# Patient Record
Sex: FEMALE | Race: OTHER | URBAN - METROPOLITAN AREA
[De-identification: names, ages, dates, MRNs, and addresses within clinical notes are randomized per-mention and may not be internally consistent; named-entity substitution may affect disease eponyms.]

---

## 2017-07-31 ENCOUNTER — OUTPATIENT (OUTPATIENT)
Dept: OUTPATIENT SERVICES | Facility: HOSPITAL | Age: 47
LOS: 1 days | Discharge: HOME | End: 2017-07-31

## 2017-07-31 DIAGNOSIS — R10.11 RIGHT UPPER QUADRANT PAIN: ICD-10-CM

## 2017-07-31 DIAGNOSIS — I11.9 HYPERTENSIVE HEART DISEASE WITHOUT HEART FAILURE: ICD-10-CM

## 2017-07-31 DIAGNOSIS — E78.2 MIXED HYPERLIPIDEMIA: ICD-10-CM

## 2018-03-19 ENCOUNTER — OUTPATIENT (OUTPATIENT)
Dept: OUTPATIENT SERVICES | Facility: HOSPITAL | Age: 48
LOS: 1 days | Discharge: HOME | End: 2018-03-19

## 2018-03-20 DIAGNOSIS — Z00.00 ENCOUNTER FOR GENERAL ADULT MEDICAL EXAMINATION WITHOUT ABNORMAL FINDINGS: ICD-10-CM

## 2019-07-25 ENCOUNTER — OUTPATIENT (OUTPATIENT)
Dept: OUTPATIENT SERVICES | Facility: HOSPITAL | Age: 49
LOS: 1 days | Discharge: HOME | End: 2019-07-25

## 2019-07-26 DIAGNOSIS — M25.50 PAIN IN UNSPECIFIED JOINT: ICD-10-CM

## 2019-07-26 DIAGNOSIS — R10.11 RIGHT UPPER QUADRANT PAIN: ICD-10-CM

## 2019-07-26 DIAGNOSIS — E78.2 MIXED HYPERLIPIDEMIA: ICD-10-CM

## 2022-05-04 ENCOUNTER — APPOINTMENT (OUTPATIENT)
Dept: ORTHOPEDIC SURGERY | Facility: CLINIC | Age: 52
End: 2022-05-04

## 2022-05-04 PROBLEM — Z00.00 ENCOUNTER FOR PREVENTIVE HEALTH EXAMINATION: Status: ACTIVE | Noted: 2022-05-04

## 2022-05-31 ENCOUNTER — APPOINTMENT (OUTPATIENT)
Dept: ORTHOPEDIC SURGERY | Facility: CLINIC | Age: 52
End: 2022-05-31
Payer: OTHER MISCELLANEOUS

## 2022-05-31 PROCEDURE — 99214 OFFICE O/P EST MOD 30 MIN: CPT

## 2022-05-31 PROCEDURE — 99072 ADDL SUPL MATRL&STAF TM PHE: CPT

## 2022-05-31 RX ORDER — CELECOXIB 200 MG/1
200 CAPSULE ORAL TWICE DAILY
Qty: 60 | Refills: 2 | Status: ACTIVE | COMMUNITY
Start: 2022-05-31 | End: 1900-01-01
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
## 2022-05-31 NOTE — ASSESSMENT
[FreeTextEntry1] : we reviewed the anatomy, pathology, and treatment options for TFCC tears. We discussed that most of the TFCC is avascular and tears are slow to heal if at all but that surgical results are not guaranteed due to the poor healing capacity of the structure.  Even at surgery, most tears cannot be repaired, but are merely debrided.  We discussed the use of nsaids, bracing, rest, local modalities, injection and surgery in the treatment of TFCC tears. At this point, the patient will proceed with non-operative treatment.\par

## 2022-05-31 NOTE — PHYSICAL EXAM
[de-identified] : Left Hand: Inspection of the hand/wrist is as follows: No swelling. Palpation of the hand/wrist is as\par follows: Tenderness over anatomic snuff box. ttp dorsal SL Range of motion of the hand/wrist is as\par follows: Wrist dorsiflexion to 50 degrees. Wrist volarflexion to 50 degrees. good active flexion and extension of all\par finger joints. Special testing of the hand/wrist is as follows: painful watsons Neurological testing of the hand/wrist is\par as follows: light touch intact throughout, palpable radial pulse and good capillary refill in all fingers.

## 2022-07-12 ENCOUNTER — APPOINTMENT (OUTPATIENT)
Dept: ORTHOPEDIC SURGERY | Facility: CLINIC | Age: 52
End: 2022-07-12

## 2022-07-12 VITALS — BODY MASS INDEX: 32.13 KG/M2 | WEIGHT: 212 LBS | HEIGHT: 68 IN

## 2022-07-12 PROCEDURE — 99072 ADDL SUPL MATRL&STAF TM PHE: CPT

## 2022-07-12 PROCEDURE — 99214 OFFICE O/P EST MOD 30 MIN: CPT

## 2022-07-12 RX ORDER — CELECOXIB 200 MG/1
200 CAPSULE ORAL TWICE DAILY
Qty: 60 | Refills: 2 | Status: ACTIVE | COMMUNITY
Start: 2022-07-12 | End: 1900-01-01

## 2022-07-12 NOTE — ASSESSMENT
[FreeTextEntry1] : we reviewed the anatomy, pathology, and treatment options for TFCC tears. We discussed that most of the TFCC is avascular and tears are slow to heal if at all but that surgical results are not guaranteed due to the poor healing capacity of the structure.  Even at surgery, most tears cannot be repaired, but are merely debrided.  We discussed the use of nsaids, bracing, rest, local modalities, injection and surgery in the treatment of TFCC tears. At this point, the patient will proceed with non-operative treatment.\par \par Pt referred for OT x 8 weeks.\par Continue use of Widget Brace.\par Possibility of CSI to the left TFCC region discussed. \par Refill of Celebrex provided.\par RTO in 3 mos for f/u care. \par
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
## 2022-07-12 NOTE — IMAGING
[de-identified] : Left Hand: Inspection of the hand/wrist is as follows: No swelling. Palpation of the hand/wrist is as\par follows: +ttp over the left TFCC region with + Grind test. No tenderness over anatomic snuff box. Minimal ttp dorsal SL \par Range of motion of the hand/wrist is as follows: Wrist dorsiflexion to 60 degrees. Wrist volarflexion to 30 degrees. good active flexion and extension of all\par finger joints. Special testing of the hand/wrist is as follows: painful watsons Neurological testing of the hand/wrist is\par as follows: light touch intact throughout, palpable radial pulse and good capillary refill in all fingers.\par Spurling Signs are negative symmetrically.\par \par Incidental finding of left shoulder pain is noted.\par Pt has + impingement signs and mildly limited strength with abduction and IROM.\par (Pt will f/u with this on her insurance). \par

## 2022-10-11 ENCOUNTER — APPOINTMENT (OUTPATIENT)
Dept: ORTHOPEDIC SURGERY | Facility: CLINIC | Age: 52
End: 2022-10-11

## 2022-11-16 ENCOUNTER — NON-APPOINTMENT (OUTPATIENT)
Age: 52
End: 2022-11-16

## 2022-11-16 ENCOUNTER — APPOINTMENT (OUTPATIENT)
Dept: ORTHOPEDIC SURGERY | Facility: CLINIC | Age: 52
End: 2022-11-16

## 2022-11-16 VITALS — HEIGHT: 68 IN | WEIGHT: 212 LBS | BODY MASS INDEX: 32.13 KG/M2

## 2022-11-16 PROCEDURE — L3908: CPT | Mod: LT

## 2022-11-16 PROCEDURE — 99214 OFFICE O/P EST MOD 30 MIN: CPT

## 2022-11-16 PROCEDURE — 99072 ADDL SUPL MATRL&STAF TM PHE: CPT

## 2022-11-16 RX ORDER — CELECOXIB 200 MG/1
200 CAPSULE ORAL TWICE DAILY
Qty: 60 | Refills: 2 | Status: ACTIVE | COMMUNITY
Start: 2022-11-16 | End: 1900-01-01

## 2022-11-16 NOTE — IMAGING
[de-identified] : Left Hand: Inspection of the hand/wrist is as follows: No swelling. Palpation of the hand/wrist is as\par follows: +ttp over the left TFCC region with + Grind test. \par No tenderness over anatomic snuff box. Minimal ttp dorsal SL \par Range of motion of the hand/wrist is as follows: \par Wrist dorsiflexion to 80 degrees. \par Wrist volar flexion to 90 degrees. \par good active flexion and extension of allfinger joints. ]\par Special testing of the hand/wrist is as follows: painful watsons \par Neurological testing of the hand/wrist is\par as follows: light touch intact throughout, palpable radial pulse and good capillary refill in all fingers.\par +Tinel sign over the left carpal tunnel / negative over the Cubital Tunnel. \par Spurling Signs are negative symmetrically.\par \par Incidental finding of left shoulder pain is noted.\par Pt has + impingement signs and mildly limited strength with abduction and IROM.\par (Pt again instructed to f/u with this on her private insurance). \par
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
## 2022-11-16 NOTE — WORK
[Sprain/Strain] : sprain/strain [Was the competent medical cause of the injury] : was the competent medical cause of the injury [Are consistent with the injury] : are consistent with the injury [Consistent with my objective findings] : consistent with my objective findings [Total] : total [Does not reveal pre-existing condition(s) that may affect treatment/prognosis] : does not reveal pre-existing condition(s) that may affect treatment/prognosis [Can return to work without limitations on ______] : can return to work without limitations on [unfilled] [Lifting] : lifting [Pulling/Pushing] : pulling/pushing [Use of upper extremities] : use of upper extremities [Unknown at this time] : : unknown at this time [Patient] : patient [No Rx restrictions] : No Rx restrictions. [I provided the services listed above] :  I provided the services listed above. [FreeTextEntry1] : guarded

## 2022-11-16 NOTE — ASSESSMENT
[FreeTextEntry1] : we reviewed the anatomy, pathology, and treatment options for TFCC tears. We discussed that most of the TFCC is avascular and tears are slow to heal if at all but that surgical results are not guaranteed due to the poor healing capacity of the structure.  Even at surgery, most tears cannot be repaired, but are merely debrided.  We discussed the use of nsaids, bracing, rest, local modalities, injection and surgery in the treatment of TFCC tears. At this point, the patient will proceed with non-operative treatment.\par \par Pt referred for OT x 8 weeks.\par Continue use of Widget Brace prn pain\par Possibility of CSI to the left TFCC region discussed. \par Celebrex qd/bid prn pain provided with 2 refills.\par Pt will stay out of work x 2 weeks.\par RTO in 2 mos for f/u care. \par Night splint x 6 weeks.

## 2022-12-09 ENCOUNTER — NON-APPOINTMENT (OUTPATIENT)
Age: 52
End: 2022-12-09

## 2022-12-12 ENCOUNTER — APPOINTMENT (OUTPATIENT)
Dept: ORTHOPEDIC SURGERY | Facility: CLINIC | Age: 52
End: 2022-12-12

## 2022-12-16 ENCOUNTER — NON-APPOINTMENT (OUTPATIENT)
Age: 52
End: 2022-12-16

## 2022-12-28 ENCOUNTER — APPOINTMENT (OUTPATIENT)
Dept: ORTHOPEDIC SURGERY | Facility: CLINIC | Age: 52
End: 2022-12-28

## 2022-12-28 PROCEDURE — 99072 ADDL SUPL MATRL&STAF TM PHE: CPT

## 2022-12-28 PROCEDURE — 99214 OFFICE O/P EST MOD 30 MIN: CPT

## 2022-12-28 NOTE — ASSESSMENT
[FreeTextEntry1] : we reviewed the anatomy, pathology, and treatment options for TFCC tears. We discussed that most of the TFCC is avascular and tears are slow to heal if at all but that surgical results are not guaranteed due to the poor healing capacity of the structure.  Even at surgery, most tears cannot be repaired, but are merely debrided.  We discussed the use of nsaids, bracing, rest, local modalities, injection and surgery in the treatment of TFCC tears. At this point, the patient will proceed with non-operative treatment.\par will continue with  OT.\par Continue use of Widget Brace prn pain\par patient feels she is unable to work \par RTO in 2 mos for f/u care. \par .

## 2022-12-28 NOTE — IMAGING
[de-identified] : Left Hand: Inspection of the hand/wrist is as follows: No swelling. Palpation of the hand/wrist is as\par follows: +ttp over the left TFCC region with + Grind test. \par No tenderness over anatomic snuff box. Minimal ttp dorsal SL \par Range of motion of the hand/wrist is as follows: \par Wrist dorsiflexion to 80 degrees. \par Wrist volar flexion to 90 degrees. \par good active flexion and extension of allfinger joints. ]\par Special testing of the hand/wrist is as follows: painful watsons \par Neurological testing of the hand/wrist is\par as follows: light touch intact throughout, palpable radial pulse and good capillary refill in all fingers.\par +Tinel sign over the left carpal tunnel / negative over the Cubital Tunnel. \par Spurling Signs are negative symmetrically.\par \par \par

## 2023-01-10 ENCOUNTER — NON-APPOINTMENT (OUTPATIENT)
Age: 53
End: 2023-01-10

## 2023-01-26 ENCOUNTER — NON-APPOINTMENT (OUTPATIENT)
Age: 53
End: 2023-01-26

## 2023-02-06 ENCOUNTER — FORM ENCOUNTER (OUTPATIENT)
Age: 53
End: 2023-02-06

## 2023-02-28 ENCOUNTER — APPOINTMENT (OUTPATIENT)
Dept: ORTHOPEDIC SURGERY | Facility: CLINIC | Age: 53
End: 2023-02-28
Payer: OTHER MISCELLANEOUS

## 2023-02-28 VITALS — WEIGHT: 212 LBS | BODY MASS INDEX: 32.13 KG/M2 | HEIGHT: 68 IN

## 2023-02-28 PROCEDURE — 99214 OFFICE O/P EST MOD 30 MIN: CPT

## 2023-02-28 PROCEDURE — 99072 ADDL SUPL MATRL&STAF TM PHE: CPT

## 2023-02-28 NOTE — REASON FOR VISIT
[FreeTextEntry2] :  2/16/22 Follow up visit for left wrist.  Pt has pain in wrist shooting up to elbow.

## 2023-02-28 NOTE — ASSESSMENT
[FreeTextEntry1] : we reviewed the anatomy, pathology, and treatment options for TFCC tears. We discussed that most of the TFCC is avascular and tears are slow to heal if at all but that surgical results are not guaranteed due to the poor healing capacity of the structure.  Even at surgery, most tears cannot be repaired, but are merely debrided.  We discussed the use of nsaids, bracing, rest, local modalities, injection and surgery in the treatment of TFCC tears. At this point, the patient will proceed with non-operative treatment.\par will continue with  OT.\par \par Pt will attempt to have left shoulder and elbow added onto her case. \par Continue use of Widget Brace prn pain\par patient currently working full duty. \par RTO in 3 mos for f/u care. \par .
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
## 2023-02-28 NOTE — IMAGING
[de-identified] : Left Hand: Inspection of the hand/wrist is as follows: \par No swelling. \par Palpation of the hand/wrist is as\par follows: +ttp over the left TFCC region with + Grind test. \par No tenderness over anatomic snuff box. Minimal ttp dorsal SL \par Range of motion of the hand/wrist is as follows: \par Wrist dorsiflexion to 90 degrees. \par Wrist volar flexion to 90 degrees. \par good active flexion and extension of all finger joints. \par Special testing of the hand/wrist is as follows: painful Watsons w/o instability\par Neurological testing of the hand/wrist is\par as follows: light touch intact throughout, palpable radial pulse and good capillary refill in all fingers.\par Mildly +Tinel sign over the left carpal tunnel / negative over the Cubital Tunnel. \par \par Spurling Signs are negative symmetrically\par Cervical ROM is full with mild pain on lateral rotation.\par Ayoub Signs are negative. \par Left shoulder with full ROM.\par Strength is 5/5 in all planes.\par Pt has + impingement signs and ttp over the anterolateral subacromial region.\par There is no ligamentous laxity noted\par Left elbow with full ROM.\par Mild ttp over the distal triceps tendon.\par There is no ligamentous laxity to the left elbow.\par \par \par

## 2023-03-15 ENCOUNTER — FORM ENCOUNTER (OUTPATIENT)
Age: 53
End: 2023-03-15
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
## 2023-06-06 ENCOUNTER — APPOINTMENT (OUTPATIENT)
Dept: ORTHOPEDIC SURGERY | Facility: CLINIC | Age: 53
End: 2023-06-06
Payer: OTHER MISCELLANEOUS

## 2023-06-06 VITALS — BODY MASS INDEX: 32.13 KG/M2 | WEIGHT: 212 LBS | HEIGHT: 68 IN

## 2023-06-06 PROCEDURE — 99214 OFFICE O/P EST MOD 30 MIN: CPT
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
## 2023-06-06 NOTE — ASSESSMENT
[FreeTextEntry1] : we reviewed the anatomy, pathology, and treatment options for TFCC tears. We discussed that most of the TFCC is avascular and tears are slow to heal if at all but that surgical results are not guaranteed due to the poor healing capacity of the structure.  Even at surgery, most tears cannot be repaired, but are merely debrided.  We discussed the use of nsaids, bracing, rest, local modalities, injection and surgery in the treatment of TFCC tears. At this point, the patient will proceed with non-operative treatment.\par \par C/w OT\par Continue use of Widget Brace prn pain\par patient currently working full duty. \par RTO in 3 mos for f/u care. \par .

## 2023-06-06 NOTE — WORK
[Sprain/Strain] : sprain/strain [Was the competent medical cause of the injury] : was the competent medical cause of the injury [Are consistent with the injury] : are consistent with the injury [Consistent with my objective findings] : consistent with my objective findings [Does not reveal pre-existing condition(s) that may affect treatment/prognosis] : does not reveal pre-existing condition(s) that may affect treatment/prognosis [Can return to work without limitations on ______] : can return to work without limitations on [unfilled] [Lifting] : lifting [Pulling/Pushing] : pulling/pushing [Use of upper extremities] : use of upper extremities [Unknown at this time] : : unknown at this time [Patient] : patient [No Rx restrictions] : No Rx restrictions. [I provided the services listed above] :  I provided the services listed above. [FreeTextEntry1] : guarded

## 2023-08-10 ENCOUNTER — APPOINTMENT (OUTPATIENT)
Dept: ORTHOPEDIC SURGERY | Facility: CLINIC | Age: 53
End: 2023-08-10
Payer: OTHER MISCELLANEOUS

## 2023-08-10 VITALS — HEIGHT: 68 IN | BODY MASS INDEX: 32.13 KG/M2 | WEIGHT: 212 LBS

## 2023-08-10 DIAGNOSIS — S43.402A UNSPECIFIED SPRAIN OF LEFT SHOULDER JOINT, INITIAL ENCOUNTER: ICD-10-CM

## 2023-08-10 PROCEDURE — 99214 OFFICE O/P EST MOD 30 MIN: CPT

## 2023-08-10 PROCEDURE — 95864 NEEDLE EMG 4 EXTREMITIES: CPT | Mod: LT

## 2023-08-10 NOTE — REASON FOR VISIT
[FreeTextEntry2] : Follow up for left wrist. Pain is the same. Has not been to physical therapy since last visit. Has been taking tylenol and wearing brace.

## 2023-08-10 NOTE — ASSESSMENT
[FreeTextEntry1] : we reviewed the anatomy, pathology, and treatment options for TFCC tears. We discussed that most of the TFCC is avascular and tears are slow to heal if at all but that surgical results are not guaranteed due to the poor healing capacity of the structure.  Even at surgery, most tears cannot be repaired, but are merely debrided.  We discussed the use of nsaids, bracing, rest, local modalities, injection and surgery in the treatment of TFCC tears. At this point, the patient will proceed with non-operative treatment.  C/w PT Continue use of Widget Brace prn pain patient currently working full duty.  RTO in 3 mos for f/u care.  .

## 2023-08-10 NOTE — WORK
[Sprain/Strain] : sprain/strain [Was the competent medical cause of the injury] : was the competent medical cause of the injury [Are consistent with the injury] : are consistent with the injury [Consistent with my objective findings] : consistent with my objective findings [Can return to work without limitations on ______] : can return to work without limitations on [unfilled] [Does not reveal pre-existing condition(s) that may affect treatment/prognosis] : does not reveal pre-existing condition(s) that may affect treatment/prognosis [Lifting] : lifting [Pulling/Pushing] : pulling/pushing [Use of upper extremities] : use of upper extremities [Unknown at this time] : : unknown at this time [Patient] : patient [No Rx restrictions] : No Rx restrictions. [I provided the services listed above] :  I provided the services listed above. [Mild Partial] : mild partial [FreeTextEntry1] : guarded
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
## 2023-08-10 NOTE — IMAGING
[de-identified] : Left Hand: Inspection of the hand/wrist is as follows:  No swelling.  Palpation of the hand/wrist is as follows: +ttp over the left TFCC region with + Grind test.  No tenderness over anatomic snuff box. Minimal ttp dorsal SL  Range of motion of the hand/wrist is as follows:  Wrist dorsiflexion to 90 degrees.  Wrist volar flexion to 90 degrees.  good active flexion and extension of all finger joints.  Special testing of the hand/wrist is as follows: painful Watsons w/o instability Neurological testing of the hand/wrist is: as follows: light diminished to the entire left hand.  palpable radial pulse and good capillary refill in all fingers. Mildly +Tinel sign over the left carpal tunnel / negative over the Cubital Tunnel.   Spurling Signs are negative symmetrically Cervical ROM is full with mild pain on lateral rotation. Ayoub Signs are negative.  Left shoulder with full ROM. Strength is 5/5 in all planes. Pt has + impingement signs and ttp over the anterolateral subacromial region. There is no ligamentous laxity noted Left elbow with full ROM. Mild ttp over the distal triceps tendon. There is no ligamentous laxity to the left elbow.

## 2023-08-17 ENCOUNTER — NON-APPOINTMENT (OUTPATIENT)
Age: 53
End: 2023-08-17

## 2023-08-17 ENCOUNTER — APPOINTMENT (OUTPATIENT)
Dept: ORTHOPEDIC SURGERY | Facility: CLINIC | Age: 53
End: 2023-08-17
Payer: OTHER MISCELLANEOUS

## 2023-08-17 VITALS — BODY MASS INDEX: 32.13 KG/M2 | WEIGHT: 212 LBS | HEIGHT: 68 IN

## 2023-08-17 PROCEDURE — 73562 X-RAY EXAM OF KNEE 3: CPT | Mod: LT

## 2023-08-17 PROCEDURE — 99214 OFFICE O/P EST MOD 30 MIN: CPT

## 2023-08-17 NOTE — WORK
[Crush Injury] : crush injury [Was the competent medical cause of the injury] : was the competent medical cause of the injury [Are consistent with the injury] : are consistent with the injury [Consistent with my objective findings] : consistent with my objective findings [Partial] : partial [Does not reveal pre-existing condition(s) that may affect treatment/prognosis] : does not reveal pre-existing condition(s) that may affect treatment/prognosis [Can return to work without limitations on ______] : can return to work without limitations on [unfilled] [Kneeling] : kneeling [Lifting] : lifting [Walking] : walking [Unknown at this time] : : unknown at this time [Patient] : patient [No Rx restrictions] : No Rx restrictions. [I provided the services listed above] :  I provided the services listed above. [FreeTextEntry1] : good

## 2023-08-17 NOTE — ASSESSMENT
[FreeTextEntry1] : The patient was advised of the diagnosis. The natural history of the pathology was explained in full to the patient in layman's terms. All questions were answered. The risks and benefits of surgical and non-surgical treatment alternatives were explained in full to the patient.  referred to PT X 6 weeks.  No work x 2 weeks.
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
## 2023-08-17 NOTE — IMAGING
[Left] : left knee [de-identified] : left knee with no skin changes/bony deformity. +Patella Compression Test. -Patella Grind Test. There is no ligamentous laxity noted.  There is no jointline ttp noted. Spring and Anna Tests are negative. Gait is normal. 'LLE is nvi. [FreeTextEntry9] : mild knee OA lateral patella tilting noted.

## 2023-08-31 ENCOUNTER — NON-APPOINTMENT (OUTPATIENT)
Age: 53
End: 2023-08-31

## 2023-09-08 ENCOUNTER — NON-APPOINTMENT (OUTPATIENT)
Age: 53
End: 2023-09-08

## 2023-09-13 ENCOUNTER — APPOINTMENT (OUTPATIENT)
Dept: OTOLARYNGOLOGY | Facility: CLINIC | Age: 53
End: 2023-09-13
Payer: COMMERCIAL

## 2023-09-13 VITALS
SYSTOLIC BLOOD PRESSURE: 126 MMHG | BODY MASS INDEX: 31.83 KG/M2 | WEIGHT: 210 LBS | DIASTOLIC BLOOD PRESSURE: 77 MMHG | OXYGEN SATURATION: 97 % | HEIGHT: 68 IN | TEMPERATURE: 97.8 F | HEART RATE: 96 BPM

## 2023-09-13 DIAGNOSIS — R09.81 NASAL CONGESTION: ICD-10-CM

## 2023-09-13 DIAGNOSIS — H61.23 IMPACTED CERUMEN, BILATERAL: ICD-10-CM

## 2023-09-13 DIAGNOSIS — Z80.9 FAMILY HISTORY OF MALIGNANT NEOPLASM, UNSPECIFIED: ICD-10-CM

## 2023-09-13 DIAGNOSIS — J31.0 CHRONIC RHINITIS: ICD-10-CM

## 2023-09-13 DIAGNOSIS — H93.299 OTHER ABNORMAL AUDITORY PERCEPTIONS, UNSPECIFIED EAR: ICD-10-CM

## 2023-09-13 DIAGNOSIS — I10 ESSENTIAL (PRIMARY) HYPERTENSION: ICD-10-CM

## 2023-09-13 DIAGNOSIS — G35 MULTIPLE SCLEROSIS: ICD-10-CM

## 2023-09-13 DIAGNOSIS — Z82.49 FAMILY HISTORY OF ISCHEMIC HEART DISEASE AND OTHER DISEASES OF THE CIRCULATORY SYSTEM: ICD-10-CM

## 2023-09-13 PROCEDURE — 99204 OFFICE O/P NEW MOD 45 MIN: CPT | Mod: 25

## 2023-09-13 PROCEDURE — 92550 TYMPANOMETRY & REFLEX THRESH: CPT | Mod: 52

## 2023-09-13 PROCEDURE — 69210 REMOVE IMPACTED EAR WAX UNI: CPT

## 2023-09-13 PROCEDURE — 31231 NASAL ENDOSCOPY DX: CPT

## 2023-09-13 PROCEDURE — 92557 COMPREHENSIVE HEARING TEST: CPT

## 2023-09-13 RX ORDER — HYDROCHLOROTHIAZIDE 25 MG/1
25 TABLET ORAL
Refills: 0 | Status: ACTIVE | COMMUNITY

## 2023-09-13 RX ORDER — DIROXIMEL FUMARATE 231 MG/1
CAPSULE ORAL
Refills: 0 | Status: ACTIVE | COMMUNITY

## 2023-09-22 ENCOUNTER — NON-APPOINTMENT (OUTPATIENT)
Age: 53
End: 2023-09-22

## 2023-09-26 ENCOUNTER — APPOINTMENT (OUTPATIENT)
Dept: ORTHOPEDIC SURGERY | Facility: CLINIC | Age: 53
End: 2023-09-26
Payer: OTHER MISCELLANEOUS

## 2023-09-26 VITALS — HEIGHT: 68 IN | WEIGHT: 210 LBS | BODY MASS INDEX: 31.83 KG/M2

## 2023-09-26 PROCEDURE — 99214 OFFICE O/P EST MOD 30 MIN: CPT

## 2023-09-29 ENCOUNTER — APPOINTMENT (OUTPATIENT)
Dept: MRI IMAGING | Facility: CLINIC | Age: 53
End: 2023-09-29

## 2023-09-30 ENCOUNTER — APPOINTMENT (OUTPATIENT)
Dept: MRI IMAGING | Facility: CLINIC | Age: 53
End: 2023-09-30
Payer: OTHER MISCELLANEOUS

## 2023-09-30 PROCEDURE — 73721 MRI JNT OF LWR EXTRE W/O DYE: CPT | Mod: LT

## 2023-10-10 ENCOUNTER — APPOINTMENT (OUTPATIENT)
Dept: ORTHOPEDIC SURGERY | Facility: CLINIC | Age: 53
End: 2023-10-10
Payer: OTHER MISCELLANEOUS

## 2023-10-10 ENCOUNTER — NON-APPOINTMENT (OUTPATIENT)
Age: 53
End: 2023-10-10

## 2023-10-10 VITALS — BODY MASS INDEX: 31.83 KG/M2 | HEIGHT: 68 IN | WEIGHT: 210 LBS

## 2023-10-10 PROCEDURE — 99214 OFFICE O/P EST MOD 30 MIN: CPT

## 2023-10-25 ENCOUNTER — NON-APPOINTMENT (OUTPATIENT)
Age: 53
End: 2023-10-25

## 2023-11-27 ENCOUNTER — APPOINTMENT (OUTPATIENT)
Dept: ORTHOPEDIC SURGERY | Facility: CLINIC | Age: 53
End: 2023-11-27
Payer: OTHER MISCELLANEOUS

## 2023-11-27 VITALS — WEIGHT: 214 LBS | BODY MASS INDEX: 32.43 KG/M2 | HEIGHT: 68 IN

## 2023-11-27 PROCEDURE — 99214 OFFICE O/P EST MOD 30 MIN: CPT

## 2023-11-28 ENCOUNTER — APPOINTMENT (OUTPATIENT)
Dept: ORTHOPEDIC SURGERY | Facility: CLINIC | Age: 53
End: 2023-11-28
Payer: OTHER MISCELLANEOUS

## 2023-11-28 VITALS — WEIGHT: 214 LBS | BODY MASS INDEX: 32.43 KG/M2 | HEIGHT: 68 IN

## 2023-11-28 PROCEDURE — 99213 OFFICE O/P EST LOW 20 MIN: CPT

## 2023-12-12 ENCOUNTER — TRANSCRIPTION ENCOUNTER (OUTPATIENT)
Age: 53
End: 2023-12-12

## 2024-01-30 ENCOUNTER — APPOINTMENT (OUTPATIENT)
Dept: ORTHOPEDIC SURGERY | Facility: CLINIC | Age: 54
End: 2024-01-30

## 2024-02-01 ENCOUNTER — NON-APPOINTMENT (OUTPATIENT)
Age: 54
End: 2024-02-01

## 2024-02-01 ENCOUNTER — APPOINTMENT (OUTPATIENT)
Dept: ORTHOPEDIC SURGERY | Facility: CLINIC | Age: 54
End: 2024-02-01
Payer: OTHER MISCELLANEOUS

## 2024-02-01 PROCEDURE — 99214 OFFICE O/P EST MOD 30 MIN: CPT

## 2024-02-01 NOTE — IMAGING
[de-identified] : left knee with no skin changes/bony deformity. +Patella Compression Test. -Patella Grind Test. There is no ligamentous laxity noted.  ttp over medial and lateral joint line Spring and Anna Tests are negative. Gait is normal. 'LLE is nvi.

## 2024-02-01 NOTE — ASSESSMENT
[FreeTextEntry1] : The patient was advised of the diagnosis. The natural history of the pathology was explained in full to the patient in layman's terms. All questions were answered. The risks and benefits of surgical and non-surgical treatment alternatives were explained in full to the patient.  c/w PT f/u 3 months
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
## 2024-02-15 ENCOUNTER — APPOINTMENT (OUTPATIENT)
Dept: ORTHOPEDIC SURGERY | Facility: CLINIC | Age: 54
End: 2024-02-15

## 2024-02-22 ENCOUNTER — APPOINTMENT (OUTPATIENT)
Dept: ORTHOPEDIC SURGERY | Facility: CLINIC | Age: 54
End: 2024-02-22
Payer: OTHER MISCELLANEOUS

## 2024-02-22 PROCEDURE — 99214 OFFICE O/P EST MOD 30 MIN: CPT
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
## 2024-02-27 NOTE — ASSESSMENT
[FreeTextEntry1] : The patient was advised of the diagnosis. The natural history of the pathology was explained in full to the patient in layman's terms. All questions were answered. The risks and benefits of surgical and non-surgical treatment alternatives were explained in full to the patient. Pt informed that carpal tunnel syndrome may be causally related to original crush injury.   F/u in 3 months

## 2024-02-27 NOTE — WORK
[Sprain/Strain] : sprain/strain [Was the competent medical cause of the injury] : was the competent medical cause of the injury [Crush Injury] : crush injury [Are consistent with the injury] : are consistent with the injury [Consistent with my objective findings] : consistent with my objective findings [Mild Partial] : mild partial [Does not reveal pre-existing condition(s) that may affect treatment/prognosis] : does not reveal pre-existing condition(s) that may affect treatment/prognosis [Kneeling] : kneeling [Can return to work without limitations on ______] : can return to work without limitations on [unfilled] [Lifting] : lifting [Walking] : walking [Pulling/Pushing] : pulling/pushing [Use of upper extremities] : use of upper extremities [Unknown at this time] : : unknown at this time [Patient] : patient [No Rx restrictions] : No Rx restrictions. [I provided the services listed above] :  I provided the services listed above. [FreeTextEntry1] : guarded

## 2024-03-14 ENCOUNTER — APPOINTMENT (OUTPATIENT)
Dept: NEUROLOGY | Facility: CLINIC | Age: 54
End: 2024-03-14
Payer: OTHER MISCELLANEOUS

## 2024-03-14 PROCEDURE — 95886 MUSC TEST DONE W/N TEST COMP: CPT

## 2024-03-14 PROCEDURE — 95911 NRV CNDJ TEST 9-10 STUDIES: CPT

## 2024-03-20 ENCOUNTER — APPOINTMENT (OUTPATIENT)
Dept: ORTHOPEDIC SURGERY | Facility: CLINIC | Age: 54
End: 2024-03-20

## 2024-05-21 ENCOUNTER — APPOINTMENT (OUTPATIENT)
Dept: ORTHOPEDIC SURGERY | Facility: CLINIC | Age: 54
End: 2024-05-21
Payer: OTHER MISCELLANEOUS

## 2024-05-21 DIAGNOSIS — S69.82XA OTHER SPECIFIED INJURIES OF LEFT WRIST, HAND AND FINGER(S), INITIAL ENCOUNTER: ICD-10-CM

## 2024-05-21 DIAGNOSIS — G56.02 CARPAL TUNNEL SYNDROME, LEFT UPPER LIMB: ICD-10-CM

## 2024-05-21 PROCEDURE — 99214 OFFICE O/P EST MOD 30 MIN: CPT

## 2024-05-21 NOTE — ASSESSMENT
[FreeTextEntry1] : The patient was advised of the diagnosis. The natural history of the pathology was explained in full to the patient in layman's terms. All questions were answered. The risks and benefits of surgical and non-surgical treatment alternatives were explained in full to the patient. Pt informed that carpal tunnel syndrome may be causally related to original crush injury.  Pt will be scheduled for left carpal tunnel endoscopic release. We discussed the recommendation for carpal tunnel surgery- We reviewed that the goal of surgery is to prevent worsening and give the nerve a chance to recover. If symptoms are constant there is a chance that the nerve is already too badly damaged and no longer has the potential to recover.Risks, benefits, and alternatives of surgery discussed with patient (and family).Risks including but not limited to infection, blood loss, tendon damage, muscle damage, nerve damage, stiffness, pain, no resolution of symptoms, CRPS, loss of function, potential for secondary surgery, loss of limb or life.Patient understands and was allowed to voice questions or concerns.They agree to surgery  F/u in 6 weeks

## 2024-05-21 NOTE — IMAGING
[de-identified] : Left Hand: Inspection of the hand/wrist is as follows:  No swelling.  Palpation of the hand/wrist is as follows: +ttp over the left TFCC region with + Grind test.  No tenderness over anatomic snuff box. Minimal ttp dorsal SL  Range of motion of the hand/wrist is as follows:  Wrist dorsiflexion to 90 degrees.  Wrist volar flexion to 90 degrees.  good active flexion and extension of all finger joints.  Special testing of the hand/wrist is as follows: painful Watsons w/o instability Neurological testing of the hand/wrist is: as follows: light diminished to the entire left hand.  palpable radial pulse and good capillary refill in all fingers. +Tinel sign over the left carpal tunnel / negative over the Cubital Tunnel.  mild thenar atrophy noted.   Spurling Signs are negative symmetrically Cervical ROM is full with mild pain on lateral rotation. Ayoub Signs are negative.  Left shoulder with full ROM. Strength is 5/5 in all planes. Pt has + impingement signs and ttp over the anterolateral subacromial region. There is no ligamentous laxity noted Left elbow with full ROM. Mild ttp over the distal triceps tendon. There is no ligamentous laxity to the left elbow.
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
## 2024-05-21 NOTE — WORK
[Crush Injury] : crush injury [Sprain/Strain] : sprain/strain [Was the competent medical cause of the injury] : was the competent medical cause of the injury [Are consistent with the injury] : are consistent with the injury [Consistent with my objective findings] : consistent with my objective findings [Mild Partial] : mild partial [Does not reveal pre-existing condition(s) that may affect treatment/prognosis] : does not reveal pre-existing condition(s) that may affect treatment/prognosis [Can return to work without limitations on ______] : can return to work without limitations on [unfilled] [Kneeling] : kneeling [Lifting] : lifting [Walking] : walking [Pulling/Pushing] : pulling/pushing [Use of upper extremities] : use of upper extremities [Unknown at this time] : : unknown at this time [Patient] : patient [No Rx restrictions] : No Rx restrictions. [I provided the services listed above] :  I provided the services listed above. [FreeTextEntry1] : guarded

## 2024-05-23 ENCOUNTER — APPOINTMENT (OUTPATIENT)
Dept: ORTHOPEDIC SURGERY | Facility: CLINIC | Age: 54
End: 2024-05-23
Payer: OTHER MISCELLANEOUS

## 2024-05-23 VITALS — WEIGHT: 214 LBS | HEIGHT: 68 IN | BODY MASS INDEX: 32.43 KG/M2

## 2024-05-23 PROCEDURE — 99214 OFFICE O/P EST MOD 30 MIN: CPT
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
## 2024-05-23 NOTE — IMAGING
[de-identified] : left knee with no skin changes/bony deformity. +Patella Compression Test. -Patella Grind Test. There is no ligamentous laxity noted.  ttp over medial and lateral joint line Spring and Anna Tests are negative. Gait is normal. 'LLE is nvi.

## 2024-07-02 ENCOUNTER — APPOINTMENT (OUTPATIENT)
Dept: ORTHOPEDIC SURGERY | Facility: CLINIC | Age: 54
End: 2024-07-02
Payer: OTHER MISCELLANEOUS

## 2024-07-02 DIAGNOSIS — M22.2X2 PATELLOFEMORAL DISORDERS, LEFT KNEE: ICD-10-CM

## 2024-07-02 PROCEDURE — 99214 OFFICE O/P EST MOD 30 MIN: CPT

## 2024-07-11 ENCOUNTER — APPOINTMENT (OUTPATIENT)
Dept: ORTHOPEDIC SURGERY | Facility: CLINIC | Age: 54
End: 2024-07-11

## 2024-07-25 ENCOUNTER — APPOINTMENT (OUTPATIENT)
Dept: ORTHOPEDIC SURGERY | Facility: CLINIC | Age: 54
End: 2024-07-25

## 2024-08-01 ENCOUNTER — APPOINTMENT (OUTPATIENT)
Dept: ORTHOPEDIC SURGERY | Facility: CLINIC | Age: 54
End: 2024-08-01
Payer: OTHER MISCELLANEOUS

## 2024-08-01 VITALS — HEIGHT: 68 IN | BODY MASS INDEX: 32.43 KG/M2 | WEIGHT: 214 LBS

## 2024-08-01 DIAGNOSIS — G56.02 CARPAL TUNNEL SYNDROME, LEFT UPPER LIMB: ICD-10-CM

## 2024-08-01 PROCEDURE — 99214 OFFICE O/P EST MOD 30 MIN: CPT

## 2024-08-01 NOTE — IMAGING
[de-identified] : Left Hand: Inspection of the hand/wrist is as follows:  No swelling.  Palpation of the hand/wrist is as follows: +ttp over the left TFCC region with + Grind test.  No tenderness over anatomic snuff box. Minimal ttp dorsal SL  Range of motion of the hand/wrist is as follows:  Wrist dorsiflexion to 90 degrees.  Wrist volar flexion to 90 degrees.  good active flexion and extension of all finger joints.  Special testing of the hand/wrist is as follows: painful Watsons w/o instability Neurological testing of the hand/wrist is: as follows: light diminished to the entire left hand.  palpable radial pulse and good capillary refill in all fingers. +Tinel sign over the left carpal tunnel / negative over the Cubital Tunnel.  mild thenar atrophy noted.   Spurling Signs are negative symmetrically Cervical ROM is full with mild pain on lateral rotation. Ayoub Signs are negative.  Left shoulder with full ROM. Strength is 5/5 in all planes. Pt has + impingement signs and ttp over the anterolateral subacromial region. There is no ligamentous laxity noted Left elbow with full ROM. Mild ttp over the distal triceps tendon. There is no ligamentous laxity to the left elbow.

## 2024-08-01 NOTE — ASSESSMENT
[FreeTextEntry1] : The patient was advised of the diagnosis. The natural history of the pathology was explained in full to the patient in layman's terms. All questions were answered. The risks and benefits of surgical and non-surgical treatment alternatives were explained in full to the patient. Pt informed that carpal tunnel syndrome may be causally related to original crush injury.  Pt will be scheduled for left carpal tunnel endoscopic release.- has severe CTS On EMG and constant 24/7 sxs  We discussed the recommendation for carpal tunnel surgery- We reviewed that the goal of surgery is to prevent worsening and give the nerve a chance to recover. If symptoms are constant there is a chance that the nerve is already too badly damaged and no longer has the potential to recover.Risks, benefits, and alternatives of surgery discussed with patient (and family).Risks including but not limited to infection, blood loss, tendon damage, muscle damage, nerve damage, stiffness, pain, no resolution of symptoms, CRPS, loss of function, potential for secondary surgery, loss of limb or life.Patient understands and was allowed to voice questions or concerns.They agree to surgery  F/u in 6 weeks
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
## 2024-09-12 ENCOUNTER — APPOINTMENT (OUTPATIENT)
Dept: ORTHOPEDIC SURGERY | Facility: CLINIC | Age: 54
End: 2024-09-12

## 2024-09-12 VITALS — WEIGHT: 215 LBS | HEIGHT: 68 IN | BODY MASS INDEX: 32.58 KG/M2

## 2024-09-12 DIAGNOSIS — G56.02 CARPAL TUNNEL SYNDROME, LEFT UPPER LIMB: ICD-10-CM

## 2024-09-12 PROCEDURE — 99214 OFFICE O/P EST MOD 30 MIN: CPT

## 2024-09-12 NOTE — IMAGING
[de-identified] : Left Hand: Inspection of the hand/wrist is as follows:  No swelling.  Palpation of the hand/wrist is as follows: +ttp over the left TFCC region with + Grind test.  No tenderness over anatomic snuff box. Minimal ttp dorsal SL  Range of motion of the hand/wrist is as follows:  Wrist dorsiflexion to 90 degrees.  Wrist volar flexion to 90 degrees.  good active flexion and extension of all finger joints.  Special testing of the hand/wrist is as follows: painful Watsons w/o instability Neurological testing of the hand/wrist is: as follows: light diminished to the entire left hand.  palpable radial pulse and good capillary refill in all fingers. +Tinel sign over the left carpal tunnel / negative over the Cubital Tunnel.  mild thenar atrophy noted.   Spurling Signs are negative symmetrically Cervical ROM is full with mild pain on lateral rotation. Ayoub Signs are negative.  Left shoulder with full ROM. Strength is 5/5 in all planes. Pt has + impingement signs and ttp over the anterolateral subacromial region. There is no ligamentous laxity noted Left elbow with full ROM. Mild ttp over the distal triceps tendon. There is no ligamentous laxity to the left elbow.

## 2024-09-12 NOTE — ASSESSMENT
[FreeTextEntry1] : The patient was advised of the diagnosis. The natural history of the pathology was explained in full to the patient in layman's terms. All questions were answered. The risks and benefits of surgical and non-surgical treatment alternatives were explained in full to the patient.  Risks including but not limited to infection, blood loss, tendon damage and delayed tendon disruption, muscle damage, nerve damage, stiffness, pain, arthritis, loss of function, potential for secondary surgery, loss of limb or life. The patient had the opportunity to ask questions and all questions were answered to their satisfaction.  Pt will be scheduled for left carpal tunnel endoscopic decompression.  RTO s/p surgery for suture removal and examination.
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
## 2024-10-31 RX ORDER — HYDROCODONE BITARTRATE AND ACETAMINOPHEN 5; 325 MG/1; MG/1
5-325 TABLET ORAL
Qty: 10 | Refills: 0 | Status: ACTIVE | COMMUNITY
Start: 2024-10-31 | End: 1900-01-01

## 2024-11-01 ENCOUNTER — APPOINTMENT (OUTPATIENT)
Age: 54
End: 2024-11-01

## 2024-11-01 PROCEDURE — 29848 WRIST ENDOSCOPY/SURGERY: CPT | Mod: LT

## 2024-11-01 PROCEDURE — 29848 WRIST ENDOSCOPY/SURGERY: CPT | Mod: AS,LT

## 2024-11-06 ENCOUNTER — NON-APPOINTMENT (OUTPATIENT)
Age: 54
End: 2024-11-06

## 2024-11-12 ENCOUNTER — NON-APPOINTMENT (OUTPATIENT)
Age: 54
End: 2024-11-12

## 2024-11-12 ENCOUNTER — APPOINTMENT (OUTPATIENT)
Dept: ORTHOPEDIC SURGERY | Facility: CLINIC | Age: 54
End: 2024-11-12
Payer: OTHER MISCELLANEOUS

## 2024-11-12 DIAGNOSIS — G56.02 CARPAL TUNNEL SYNDROME, LEFT UPPER LIMB: ICD-10-CM

## 2024-11-12 PROCEDURE — 99024 POSTOP FOLLOW-UP VISIT: CPT

## 2025-02-20 ENCOUNTER — APPOINTMENT (OUTPATIENT)
Dept: ORTHOPEDIC SURGERY | Facility: CLINIC | Age: 55
End: 2025-02-20
Payer: OTHER MISCELLANEOUS

## 2025-02-20 DIAGNOSIS — G56.02 CARPAL TUNNEL SYNDROME, LEFT UPPER LIMB: ICD-10-CM

## 2025-02-20 PROCEDURE — 99214 OFFICE O/P EST MOD 30 MIN: CPT

## 2025-05-19 ENCOUNTER — APPOINTMENT (OUTPATIENT)
Dept: ORTHOPEDIC SURGERY | Facility: CLINIC | Age: 55
End: 2025-05-19
Payer: OTHER MISCELLANEOUS

## 2025-05-19 DIAGNOSIS — G56.02 CARPAL TUNNEL SYNDROME, LEFT UPPER LIMB: ICD-10-CM

## 2025-05-19 PROCEDURE — 99214 OFFICE O/P EST MOD 30 MIN: CPT

## 2025-07-01 ENCOUNTER — APPOINTMENT (OUTPATIENT)
Dept: ORTHOPEDIC SURGERY | Facility: CLINIC | Age: 55
End: 2025-07-01
Payer: OTHER MISCELLANEOUS

## 2025-07-01 PROCEDURE — 99214 OFFICE O/P EST MOD 30 MIN: CPT
